# Patient Record
Sex: MALE | Race: WHITE | NOT HISPANIC OR LATINO | Employment: FULL TIME | URBAN - METROPOLITAN AREA
[De-identification: names, ages, dates, MRNs, and addresses within clinical notes are randomized per-mention and may not be internally consistent; named-entity substitution may affect disease eponyms.]

---

## 2017-04-11 ENCOUNTER — TELEPHONE (OUTPATIENT)
Dept: GASTROENTEROLOGY | Facility: CLINIC | Age: 42
End: 2017-04-11

## 2017-04-11 NOTE — TELEPHONE ENCOUNTER
----- Message from Anil Dickson sent at 4/11/2017  2:05 PM CDT -----  Contact: pt   Pt missed a call and would like the nurse to return their call.        Pt can be reached at [phone].

## 2017-04-11 NOTE — TELEPHONE ENCOUNTER
----- Message from Sindy Palmer sent at 4/11/2017 11:10 AM CDT -----  Contact: Self- [phone]  Edwin- pt called to speak with Yissel about scheduling an ep appt with Dr. Pineda- please call pt back at [phone]

## 2017-06-12 ENCOUNTER — TELEPHONE (OUTPATIENT)
Dept: GASTROENTEROLOGY | Facility: CLINIC | Age: 42
End: 2017-06-12

## 2017-06-12 NOTE — TELEPHONE ENCOUNTER
----- Message from Ghazal Romero sent at 6/12/2017 10:13 AM CDT -----  Contact: Pt [phone]  Edwin Abel is asking to speak with Yissel in regards to taking a test in [place]. Pt is also stating he would like to schedule a appointment because his stomach hurting and swelling again. Please call pt.

## 2017-06-21 ENCOUNTER — TELEPHONE (OUTPATIENT)
Dept: GASTROENTEROLOGY | Facility: CLINIC | Age: 42
End: 2017-06-21

## 2017-06-21 NOTE — TELEPHONE ENCOUNTER
----- Message from Dorita Webster sent at 6/21/2017  8:48 AM CDT -----  Contact: self - [phone]  quiana swenson his appt  Next Tuesday - please call patient at

## 2017-06-26 ENCOUNTER — TELEPHONE (OUTPATIENT)
Dept: GASTROENTEROLOGY | Facility: CLINIC | Age: 42
End: 2017-06-26

## 2017-06-26 NOTE — TELEPHONE ENCOUNTER
----- Message from Sindy Palmer sent at 6/26/2017  9:34 AM CDT -----  Contact: Self- [phone]  Edwin- pt called to speak with Yissel- only details given- please call pt back at [phone]

## 2017-06-26 NOTE — TELEPHONE ENCOUNTER
----- Message from Sindy Palmer sent at 6/26/2017  9:34 AM CDT -----  Contact: Self- [phone]  Edwin- pt called to speak with Yissel- only details given- please call pt back at [phone]

## 2017-08-10 ENCOUNTER — TELEPHONE (OUTPATIENT)
Dept: GASTROENTEROLOGY | Facility: CLINIC | Age: 42
End: 2017-08-10

## 2017-08-10 NOTE — TELEPHONE ENCOUNTER
----- Message from Elana Maria MA sent at 8/10/2017  1:23 PM CDT -----  Contact: Home: [phone]   Edwin  Asking to speak with Michell swenson: his upcoming appt.   No other info given.   Home: [phone]

## 2017-08-18 ENCOUNTER — TELEPHONE (OUTPATIENT)
Dept: GASTROENTEROLOGY | Facility: CLINIC | Age: 42
End: 2017-08-18

## 2017-08-18 NOTE — TELEPHONE ENCOUNTER
----- Message from Elana Maria MA sent at 8/18/2017  1:27 PM CDT -----  Contact: [phone]  Edwin    Returning a call re: appt scheduling.    [phone]

## 2017-08-18 NOTE — TELEPHONE ENCOUNTER
----- Message from Sindy Palmer sent at 8/18/2017 12:17 PM CDT -----  Contact: Self- [phone]  Edwin- pt called to reschedule his upcoming appt- originally for 8/22 at 11am- pt doesn't have transportation- also has questions about orders about having something done in [place]- doesn't remember what it was- please call pt back at [phone]

## 2017-09-29 ENCOUNTER — TELEPHONE (OUTPATIENT)
Dept: GASTROENTEROLOGY | Facility: CLINIC | Age: 42
End: 2017-09-29

## 2017-09-29 NOTE — TELEPHONE ENCOUNTER
----- Message from Elana Maria MA sent at 9/29/2017  1:30 PM CDT -----  Contact: Home: [phone]   Edwin  Needing to discuss his appt for next week but did to want to go into further detail with me, wants to speak with Michell.  Home: [phone]

## 2017-11-10 ENCOUNTER — TELEPHONE (OUTPATIENT)
Dept: GASTROENTEROLOGY | Facility: CLINIC | Age: 42
End: 2017-11-10

## 2018-07-03 ENCOUNTER — TELEPHONE (OUTPATIENT)
Dept: GASTROENTEROLOGY | Facility: CLINIC | Age: 43
End: 2018-07-03

## 2018-07-03 NOTE — TELEPHONE ENCOUNTER
----- Message from Dorita Webster sent at 7/3/2018 10:12 AM CDT -----  Contact: [phone]  quiana - calling about his appts - please call patient at

## 2018-08-27 ENCOUNTER — TELEPHONE (OUTPATIENT)
Dept: GASTROENTEROLOGY | Facility: CLINIC | Age: 43
End: 2018-08-27

## 2018-08-27 NOTE — TELEPHONE ENCOUNTER
----- Message from Elana Maria MA sent at 8/27/2018  1:10 PM CDT -----  Contact: [phone]  Needs Advice    Reason for call:  Pt is needing to r/s his appt on 9/4/18 with Dr Pineda      Communication Preference: [phone]

## 2018-08-28 ENCOUNTER — TELEPHONE (OUTPATIENT)
Dept: GASTROENTEROLOGY | Facility: CLINIC | Age: 43
End: 2018-08-28

## 2018-10-29 ENCOUNTER — TELEPHONE (OUTPATIENT)
Dept: ENDOSCOPY | Facility: HOSPITAL | Age: 43
End: 2018-10-29

## 2018-10-29 NOTE — TELEPHONE ENCOUNTER
----- Message from Miladys Carrillo MA sent at 10/29/2018 12:31 PM CDT -----  Contact: self  [phone]  Needs Advice    Reason for call:  States you know why I am regarding - I need to cancel the appointment for Tuesday, 10-30-18 which should be canceled all ready but I do need to talk to her about something else        Communication Preference:  Phone# above    Additional Information:  na

## 2018-10-29 NOTE — TELEPHONE ENCOUNTER
----- Message from Alana Carbone sent at 10/29/2018  3:03 PM CDT -----  Contact: pt#[phone]  Patient Returning Call from [facility]    Who Left Message for Patient:Yissel   Communication Preference:call  Additional Information:

## 2018-10-29 NOTE — TELEPHONE ENCOUNTER
----- Message from Miladys Carrillo MA sent at 10/29/2018  2:06 PM CDT -----  Contact: self  [phone]  Patient Returning Call from [facility]    Who Left Message for Patient:  kasey  Communication Preference:  Phone# above  Additional Information:  Kelly      ----- Message -----  From: Miladys Carrillo MA  Sent: 10/29/2018  12:31 PM  To: Micehll Metz MA    Needs Advice    Reason for call:  States you know why I am regarding - I need to cancel the appointment for Tuesday, 10-30-18 which should be canceled all ready but I do need to talk to her about something else        Communication Preference:  Phone# above    Additional Information:  kelly

## 2018-12-17 ENCOUNTER — TELEPHONE (OUTPATIENT)
Dept: ENDOSCOPY | Facility: HOSPITAL | Age: 43
End: 2018-12-17

## 2018-12-17 NOTE — TELEPHONE ENCOUNTER
Spoke with patient and offered f/u appointment with Dr Pineda. He deferred for now. Stated that he might schedule in March